# Patient Record
(demographics unavailable — no encounter records)

---

## 2017-02-03 NOTE — PROGRESS NOTES
Patient ID: Joshua Meng is a 21year old female. HPI  Patient presents with:  Weight Loss  Anxiety    I have not seen her for 2 years. She is going to the [employer] in [place]. She has not been on any psychiatric medicines.   She states she hated Kingsley's sign. Neurological: She is alert and oriented to person, place, and time. Skin: Skin is warm and dry. Psychiatric: Thought content normal. Her mood appears anxious. Vitals reviewed.     Blood pressure 108/62, pulse 74, temperature 98.3 °F (

## 2017-03-01 RX ORDER — PAROXETINE 10 MG/1
TABLET, FILM COATED ORAL
Qty: 30 TABLET | Refills: 2 | Status: SHIPPED | OUTPATIENT
Start: 2017-03-01

## 2017-03-01 NOTE — TELEPHONE ENCOUNTER
Refill Protocol Appointment Criteria  · Appointment scheduled in the past 6 months or in the next 3 months  Recent Visits       Provider Department Primary Dx    3 weeks ago Cinthya Garza Knee, [address] [facility] [address], [place] Generalized anxiety disor